# Patient Record
Sex: FEMALE | Race: BLACK OR AFRICAN AMERICAN | NOT HISPANIC OR LATINO | URBAN - METROPOLITAN AREA
[De-identification: names, ages, dates, MRNs, and addresses within clinical notes are randomized per-mention and may not be internally consistent; named-entity substitution may affect disease eponyms.]

---

## 2023-09-01 ENCOUNTER — PRE VISIT (OUTPATIENT)
Dept: ONCOLOGY | Facility: CLINIC | Age: 23
End: 2023-09-01
Payer: COMMERCIAL

## 2023-09-01 ENCOUNTER — TRANSCRIBE ORDERS (OUTPATIENT)
Dept: OTHER | Age: 23
End: 2023-09-01

## 2023-09-01 ENCOUNTER — PATIENT OUTREACH (OUTPATIENT)
Dept: ONCOLOGY | Facility: CLINIC | Age: 23
End: 2023-09-01
Payer: COMMERCIAL

## 2023-09-01 DIAGNOSIS — R13.10 DYSPHAGIA: Primary | ICD-10-CM

## 2023-09-01 NOTE — TELEPHONE ENCOUNTER
Action Taken  Date/Description  TJ     WT from [facility] September 1, 2023  11:45 AM   Call from Pt to schedule with thoracic surgery.  She gets food stuck in her throat.  No primary provider.  She has only treated with a dentist for the bad breath from food getting stuck.    Miguel Silva DDS,  [address] , [place] [address]   Phone: [phone]     NEGRA emailed to Pt with request to send back to [facility] email

## 2023-09-05 NOTE — PROGRESS NOTES
New Patient Oncology Nurse Navigator Note     Referring provider: Self    Referring Clinic/Organization: Self Referred  Referred to: Thoracic Surgery  Requested provider (if applicable): First available - did not specify   Referral Received: 09/01/23       Evaluation for :   Diagnosis   R13.10 (ICD-10-CM) - Dysphagia     Clinical History (per Nurse review of records provided):      09/01/2023 OV for globus sensation ([facility] - bookmarked)    Clinical Assessment / Barriers to Care (Per Nurse):      Records Location: Care Everywhere     Records Needed: Any outside imaging for dysphagia?   Additional testing needed prior to consult:   Referral updates and Plan:   Per chart review, no additional work up has been done to further investigate dysphagia such as manometry, esophagram, upper GI endoscopy.     9/6/23: Writer called Sheila prieto to discuss the referral. There was no answer. Left a voicemail to return call.     ALEJANDRO SureshN, RN, OCN  [facility] Nurse Navigator  [phone] / [phone]

## 2024-04-12 ENCOUNTER — TRANSCRIBE ORDERS (OUTPATIENT)
Dept: OTHER | Age: 24
End: 2024-04-12

## 2024-04-12 DIAGNOSIS — R79.89 LOW TSH LEVEL: Primary | ICD-10-CM

## 2024-04-12 DIAGNOSIS — E05.90 HYPERTHYROIDISM: ICD-10-CM

## 2024-04-15 ENCOUNTER — TELEPHONE (OUTPATIENT)
Dept: ENDOCRINOLOGY | Facility: CLINIC | Age: 24
End: 2024-04-15
Payer: COMMERCIAL

## 2024-04-15 NOTE — TELEPHONE ENCOUNTER
Left Voicemail (1st Attempt) for the patient to call back and schedule the following:    Appointment type: New Endocrine   Provider: Any that manage Thyroid   Return date: next avail  Specialty phone number: [phone]  Additional appointment(s) needed: NA   Additonal Notes: LVM, No MyC x1  Low TSH level [R79.89]  Hyperthyroidism [E05.90  CCs notified to schedule first available with any provider who manages thyroid.   Argelia Sandoval RN on 4/15/24 at 11:33 AM     Rochelle Rand on 4/15/2024 at 3:04 PM

## 2024-04-17 NOTE — TELEPHONE ENCOUNTER
Left Voicemail (2nd Attempt) for the patient to call back and schedule the following:    Appointment type: New Endocrine   Provider: Any that manage Thyroid   Return date: next avail  Specialty phone number: [phone]  Additional appointment(s) needed: NA   Additonal Notes: LVM x2, No MyC, letter sent x1  Low TSH level [R79.89]  Hyperthyroidism [E05.90  CCs notified to schedule first available with any provider who manages thyroid.   Argelia Sandoval RN on 4/15/24 at 11:33 AM      Rochelle Rand on 4/17/2024 at 12:10 PM

## 2025-03-08 ENCOUNTER — OFFICE VISIT (OUTPATIENT)
Dept: URGENT CARE | Facility: URGENT CARE | Age: 25
End: 2025-03-08
Payer: COMMERCIAL

## 2025-03-08 VITALS
DIASTOLIC BLOOD PRESSURE: 63 MMHG | HEART RATE: 85 BPM | OXYGEN SATURATION: 100 % | RESPIRATION RATE: 16 BRPM | SYSTOLIC BLOOD PRESSURE: 109 MMHG | TEMPERATURE: 98.9 F | WEIGHT: 151.31 LBS

## 2025-03-08 DIAGNOSIS — J02.9 SORE THROAT: ICD-10-CM

## 2025-03-08 DIAGNOSIS — J11.1 INFLUENZA-LIKE ILLNESS: Primary | ICD-10-CM

## 2025-03-08 LAB
DEPRECATED S PYO AG THROAT QL EIA: NEGATIVE
FLUAV AG SPEC QL IA: NEGATIVE
FLUBV AG SPEC QL IA: NEGATIVE
S PYO DNA THROAT QL NAA+PROBE: NOT DETECTED

## 2025-03-08 PROCEDURE — 87651 STREP A DNA AMP PROBE: CPT | Performed by: PHYSICIAN ASSISTANT

## 2025-03-08 PROCEDURE — 87635 SARS-COV-2 COVID-19 AMP PRB: CPT | Performed by: PHYSICIAN ASSISTANT

## 2025-03-08 PROCEDURE — 99204 OFFICE O/P NEW MOD 45 MIN: CPT | Performed by: PHYSICIAN ASSISTANT

## 2025-03-08 PROCEDURE — 3078F DIAST BP <80 MM HG: CPT | Performed by: PHYSICIAN ASSISTANT

## 2025-03-08 PROCEDURE — 87804 INFLUENZA ASSAY W/OPTIC: CPT | Performed by: PHYSICIAN ASSISTANT

## 2025-03-08 PROCEDURE — 3074F SYST BP LT 130 MM HG: CPT | Performed by: PHYSICIAN ASSISTANT

## 2025-03-08 RX ORDER — PROPRANOLOL HYDROCHLORIDE 10 MG/1
TABLET ORAL
COMMUNITY
Start: 2024-10-01 | End: 2025-03-08

## 2025-03-08 RX ORDER — CLINDAMYCIN PHOSPHATE 10 UG/ML
1 LOTION TOPICAL
COMMUNITY
Start: 2023-07-23 | End: 2025-03-08

## 2025-03-08 RX ORDER — KETOCONAZOLE 20 MG/ML
SHAMPOO, SUSPENSION TOPICAL
COMMUNITY
Start: 2023-07-23 | End: 2025-03-08

## 2025-03-08 RX ORDER — BISACODYL 5 MG
TABLET, DELAYED RELEASE (ENTERIC COATED) ORAL
COMMUNITY
Start: 2024-09-05 | End: 2025-03-08

## 2025-03-08 RX ORDER — CLASCOTERONE 1 G/100G
CREAM TOPICAL
COMMUNITY
Start: 2023-08-23 | End: 2025-03-08

## 2025-03-08 RX ORDER — BENZONATATE 200 MG/1
200 CAPSULE ORAL 3 TIMES DAILY PRN
Qty: 30 CAPSULE | Refills: 0 | Status: SHIPPED | OUTPATIENT
Start: 2025-03-08 | End: 2025-03-18

## 2025-03-08 RX ORDER — OMEPRAZOLE 20 MG/1
20 CAPSULE, DELAYED RELEASE ORAL
COMMUNITY
Start: 2024-06-23 | End: 2025-03-08

## 2025-03-08 RX ORDER — AZELAIC ACID 0.15 G/G
GEL TOPICAL
COMMUNITY
Start: 2023-07-23 | End: 2025-03-08

## 2025-03-08 RX ORDER — SPIRONOLACTONE 100 MG/1
200 TABLET, FILM COATED ORAL
COMMUNITY
Start: 2023-12-15 | End: 2025-03-08

## 2025-03-08 RX ORDER — IBUPROFEN 600 MG/1
600 TABLET, FILM COATED ORAL EVERY 6 HOURS PRN
Qty: 30 TABLET | Refills: 0 | Status: SHIPPED | OUTPATIENT
Start: 2025-03-08

## 2025-03-08 RX ORDER — POLYETHYLENE GLYCOL-3350 AND ELECTROLYTES WITH FLAVOR PACK 240; 5.84; 2.98; 6.72; 22.72 G/278.26G; G/278.26G; G/278.26G; G/278.26G; G/278.26G
POWDER, FOR SOLUTION ORAL
COMMUNITY
Start: 2024-09-05 | End: 2025-03-08

## 2025-03-08 RX ORDER — TRETINOIN 0.25 MG/G
CREAM TOPICAL
COMMUNITY
Start: 2023-07-23 | End: 2025-03-08

## 2025-03-08 RX ORDER — OSELTAMIVIR PHOSPHATE 75 MG/1
75 CAPSULE ORAL 2 TIMES DAILY
Qty: 10 CAPSULE | Refills: 0 | Status: SHIPPED | OUTPATIENT
Start: 2025-03-08 | End: 2025-03-13

## 2025-03-08 ASSESSMENT — ENCOUNTER SYMPTOMS
CARDIOVASCULAR NEGATIVE: 1
RHINORRHEA: 1
SORE THROAT: 1
FEVER: 1
WHEEZING: 0
NAUSEA: 1
COUGH: 1
FATIGUE: 1
CHILLS: 0
VOMITING: 0
SHORTNESS OF BREATH: 0
DIZZINESS: 1
HEADACHES: 1
SINUS PRESSURE: 0
SINUS PAIN: 0
PALPITATIONS: 0
DIARRHEA: 0

## 2025-03-08 NOTE — LETTER
March 8, 2025      Sheila Hines  [address]  [place] [address]        To Whom It May Concern:    Sheila Hines was seen in urgent care clinic today and I would recommend self quarantine until covid results are back which may take 1-2days.  Please feel free to contact me via phone if you have any questions or concerns.        Sincerely,      Linnea See DAMIAN Sanford

## 2025-03-08 NOTE — PROGRESS NOTES
Subjective   Sheila prieto is a 24 year old, presenting for the following health issues:  Urgent Care (Urgent care visit for fever, cough, headache, dizziness and nausea.), Fever (Fever on and off for three weeks. Fever started again this past Thursday. Her roommate touched her forehead and she felt hot, they do not have thermometer. She feels feverish mostly at night and in the morning.), Cough (Cough constant for three weeks with less phlegm over time. She feels she is now getting more phlegm yesterday and today with chest discomfort so came in.), Headache (Headache off and on for three weeks. It started getting worse again. When she worked out she got pulsating sensation in her temples and headache. Now the headache is constant but worse when she works out.), Dizziness (Dizziness when she moves since this past Thursday. She feels she loses her balance.), Nausea (She had nausea for about two weeks. It is still there. She has not vomited. ), and Pharyngitis (Sore throat for three weeks that got better and then worse again this past Friday.)    HPI    Acute Illness  Acute illness concerns:   Onset/Duration: 2-3days  Symptoms:  Fever: YES  Chills/Sweats: No  Headache (location?): YES  Sinus Pressure: No  Conjunctivitis:  No  Ear Pain: no  Rhinorrhea: YES  Congestion: YES along with dizziness  Sore Throat: YES  Cough: YES  Wheeze: No  Decreased Appetite: No  Nausea: YES  Vomiting: No  Diarrhea: No  Dysuria/Freq.: No  Dysuria or Hematuria: No  Fatigue/Achiness: YES  Sick/Strep Exposure: YES- at work  Therapies tried and outcome: rest,fluids,tylenol,mucinex with minimal relief    There is no problem list on file for this patient.    Current Outpatient Medications   Medication Sig Dispense Refill    dextromethorphan-guaiFENesin (MUCINEX DM)  MG per 12 hr tablet Take 1 tablet by mouth every 12 hours.      VITAMIN D PO Take by mouth.       No current facility-administered medications for this visit.      No Known  Allergies  Review of Systems   Constitutional:  Positive for fatigue and fever. Negative for chills.   HENT:  Positive for congestion, rhinorrhea and sore throat. Negative for ear pain, sinus pressure and sinus pain.    Respiratory:  Positive for cough. Negative for shortness of breath and wheezing.    Cardiovascular: Negative.  Negative for chest pain, palpitations and leg swelling.   Gastrointestinal:  Positive for nausea. Negative for diarrhea and vomiting.   Neurological:  Positive for dizziness and headaches.   All other systems reviewed and are negative.          Objective    /63 (BP Location: Left arm, Patient Position: Sitting, Cuff Size: Adult Regular)   Pulse 85   Temp 98.9  F (37.2  C) (Oral)   Resp 16   Wt 68.6 kg (151 lb 5 oz)   LMP 02/12/2025 (Exact Date)   SpO2 100%   Breastfeeding No   There is no height or weight on file to calculate BMI.  Physical Exam  Vitals and nursing note reviewed.   Constitutional:       General: She is not in acute distress.     Appearance: Normal appearance. She is well-developed and normal weight. She is not ill-appearing.   HENT:      Head: Normocephalic and atraumatic.      Comments: TMs are intact without any erythema or bulging bilaterally.  Airway is patent.     Nose: Nose normal.      Mouth/Throat:      Lips: Pink.      Mouth: Mucous membranes are moist.      Pharynx: Oropharynx is clear. Uvula midline. No pharyngeal swelling, oropharyngeal exudate or posterior oropharyngeal erythema.      Tonsils: No tonsillar exudate or tonsillar abscesses.   Eyes:      General: No scleral icterus.     Extraocular Movements: Extraocular movements intact.      Conjunctiva/sclera: Conjunctivae normal.      Pupils: Pupils are equal, round, and reactive to light.   Neck:      Thyroid: No thyromegaly.   Cardiovascular:      Rate and Rhythm: Normal rate and regular rhythm.      Pulses: Normal pulses.      Heart sounds: Normal heart sounds, S1 normal and S2 normal. No murmur  heard.     No friction rub. No gallop.   Pulmonary:      Effort: Pulmonary effort is normal. No accessory muscle usage, respiratory distress or retractions.      Breath sounds: Normal breath sounds and air entry. No stridor. No decreased breath sounds, wheezing, rhonchi or rales.   Musculoskeletal:      Cervical back: Normal range of motion and neck supple.   Lymphadenopathy:      Cervical: No cervical adenopathy.   Skin:     General: Skin is warm and dry.      Nails: There is no clubbing.   Neurological:      Mental Status: She is alert and oriented to person, place, and time.   Psychiatric:         Mood and Affect: Mood normal.         Behavior: Behavior normal.         Thought Content: Thought content normal.         Judgment: Judgment normal.        Results for orders placed or performed in visit on 03/08/25 (from the past 24 hours)   Streptococcus A Rapid Screen w/Reflex to PCR - Clinic Collect    Specimen: Throat; Swab   Result Value Ref Range    Group A Strep antigen Negative Negative   Influenza A & B Antigen - Clinic Collect    Specimen: Nose; Swab   Result Value Ref Range    Influenza A antigen Negative Negative    Influenza B antigen Negative Negative    Narrative    Test results must be correlated with clinical data. If necessary, results should be confirmed by a molecular assay or viral culture.           Assessment/Plan:  Influenza-like illness:  Rapid influenza and strep were negative, will send for strep and covid PCR.  Will treat with [id number] and give tessalon perles as needed for symptoms.  Recommend tylenol/ibuprofen prn pain/fever.  S/he in considered contagious until s/he has been fever free for at least 24hours without the use of antipyretics.  Cover coughs, sneezes and wash hands.  Rest, fluids, chicken soup.  Recheck in clinic if symptoms worsen or if symptoms do not improve.  To the ER, if  s/he develops hemoptysis, shortness of breath/wheezing, chest pain, stiff neck or fevers  >102.  -     Influenza A & B Antigen - Clinic Collect  -     COVID-19 Virus (Coronavirus) by PCR Nose  -     oseltamivir (TAMIFLU) 75 MG capsule; Take 1 capsule (75 mg) by mouth 2 times daily for 5 days.  -     benzonatate (TESSALON) 200 MG capsule; Take 1 capsule (200 mg) by mouth 3 times daily as needed for cough.  -     ibuprofen (ADVIL/MOTRIN) 600 MG tablet; Take 1 tablet (600 mg) by mouth every 6 hours as needed for fever or moderate pain.    Sore throat  -     Streptococcus A Rapid Screen w/Reflex to PCR - Clinic Collect  -     COVID-19 Virus (Coronavirus) by PCR Nose  -     Group A Streptococcus PCR Throat Swab        Linnea Sanford PA-C

## 2025-03-09 LAB — SARS-COV-2 RNA RESP QL NAA+PROBE: NEGATIVE
